# Patient Record
Sex: FEMALE | Race: WHITE | NOT HISPANIC OR LATINO | Employment: FULL TIME | URBAN - METROPOLITAN AREA
[De-identification: names, ages, dates, MRNs, and addresses within clinical notes are randomized per-mention and may not be internally consistent; named-entity substitution may affect disease eponyms.]

---

## 2022-03-06 RX ORDER — PROPRANOLOL HYDROCHLORIDE 20 MG/1
20 TABLET ORAL 2 TIMES DAILY
Qty: 60 TABLET | Refills: 6 | Status: SHIPPED | OUTPATIENT
Start: 2022-03-06 | End: 2022-08-16 | Stop reason: SDUPTHER

## 2022-08-04 ENCOUNTER — TELEPHONE (OUTPATIENT)
Dept: SCHEDULING | Facility: IMAGING CENTER | Age: 37
End: 2022-08-04

## 2022-08-16 ENCOUNTER — OFFICE VISIT (OUTPATIENT)
Dept: MEDICAL GROUP | Facility: MEDICAL CENTER | Age: 37
End: 2022-08-16
Payer: COMMERCIAL

## 2022-08-16 ENCOUNTER — HOSPITAL ENCOUNTER (OUTPATIENT)
Dept: RADIOLOGY | Facility: MEDICAL CENTER | Age: 37
End: 2022-08-16
Attending: FAMILY MEDICINE
Payer: COMMERCIAL

## 2022-08-16 VITALS
DIASTOLIC BLOOD PRESSURE: 78 MMHG | BODY MASS INDEX: 50.02 KG/M2 | OXYGEN SATURATION: 100 % | RESPIRATION RATE: 16 BRPM | WEIGHT: 293 LBS | SYSTOLIC BLOOD PRESSURE: 124 MMHG | HEIGHT: 64 IN | HEART RATE: 104 BPM | TEMPERATURE: 97.6 F

## 2022-08-16 DIAGNOSIS — M79.672 FOOT PAIN, BILATERAL: ICD-10-CM

## 2022-08-16 DIAGNOSIS — M79.671 FOOT PAIN, BILATERAL: ICD-10-CM

## 2022-08-16 DIAGNOSIS — E66.01 CLASS 3 SEVERE OBESITY DUE TO EXCESS CALORIES WITHOUT SERIOUS COMORBIDITY WITH BODY MASS INDEX (BMI) OF 50.0 TO 59.9 IN ADULT (HCC): ICD-10-CM

## 2022-08-16 DIAGNOSIS — E78.2 MIXED HYPERLIPIDEMIA: ICD-10-CM

## 2022-08-16 DIAGNOSIS — I45.6 WPW (WOLFF-PARKINSON-WHITE SYNDROME): ICD-10-CM

## 2022-08-16 PROBLEM — E28.2 PCOS (POLYCYSTIC OVARIAN SYNDROME): Status: ACTIVE | Noted: 2022-08-16

## 2022-08-16 PROBLEM — E66.813 CLASS 3 SEVERE OBESITY DUE TO EXCESS CALORIES WITHOUT SERIOUS COMORBIDITY WITH BODY MASS INDEX (BMI) OF 50.0 TO 59.9 IN ADULT (HCC): Status: ACTIVE | Noted: 2022-08-16

## 2022-08-16 PROCEDURE — 73630 X-RAY EXAM OF FOOT: CPT | Mod: LT

## 2022-08-16 PROCEDURE — 73630 X-RAY EXAM OF FOOT: CPT | Mod: RT

## 2022-08-16 PROCEDURE — 99203 OFFICE O/P NEW LOW 30 MIN: CPT | Performed by: FAMILY MEDICINE

## 2022-08-16 RX ORDER — BUPROPION HYDROCHLORIDE 300 MG/1
300 TABLET ORAL DAILY
COMMUNITY
End: 2022-08-16

## 2022-08-16 RX ORDER — PROPRANOLOL HYDROCHLORIDE 60 MG/1
60 TABLET ORAL 3 TIMES DAILY PRN
Qty: 90 TABLET | Refills: 2 | Status: SHIPPED | OUTPATIENT
Start: 2022-08-16

## 2022-08-16 ASSESSMENT — PATIENT HEALTH QUESTIONNAIRE - PHQ9: CLINICAL INTERPRETATION OF PHQ2 SCORE: 0

## 2022-08-16 NOTE — LETTER
August 16, 2022    To Whom It May Concern:         This is confirmation that Oralia CHRISTIANO Mallory attended her scheduled appointment with Jorge Hernandez D.O. on 8/16/22. Please allow her to wear athletic shoes for medical reasons.         If you have any questions please do not hesitate to call me at the phone number listed below.    Sincerely,          Jorge Hernandez D.O.  [phone]

## 2022-08-16 NOTE — ASSESSMENT & PLAN NOTE
Did not get to fully assess her discussed this but she does understand the importance of weight loss.  She reports good habits but difficulty losing weight and feels that it is hormonal in nature.  She does have known PCOS and will be following up with gynecology in the future.  I would like to continue to discuss this with her and work on it but we had some more acute matters at hand today.

## 2022-08-16 NOTE — ASSESSMENT & PLAN NOTE
Provided refill of propanolol that dose is closer to what she is taking.  Patient aware that anxiety certainly can be playing a role in this.  Labs to follow.  We will continue discussion and may need to refer to cardiology here in town.

## 2022-08-16 NOTE — ASSESSMENT & PLAN NOTE
Patient with bilateral foot pain she does understand the role her weight can play but she is also having a lot of pain in general first thing in the morning.  She gets some leg swelling as the day goes on this could be couple different things I suspect possible Planter fasciitis outside chance of stress fractures and then could be some venous insufficiency or poor lymphatic drainage low but possible chance of cardiac involvement though echo last year was reassuring.  We will get x-ray and referral to podiatry to start.

## 2022-08-16 NOTE — PROGRESS NOTES
Subjective:     CC:  Diagnoses of Foot pain, bilateral, Class 3 severe obesity due to excess calories without serious comorbidity with body mass index (BMI) of 50.0 to 59.9 in adult (HCC), WPW (Jorge-Parkinson-White syndrome), and Mixed hyperlipidemia were pertinent to this visit.    HISTORY OF THE PRESENT ILLNESS: Patient is a 36 y.o. female. This pleasant patient is here today to establish care and discuss palpitations and foot pain.     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  - works long hours and on feet on concrete all day  - right foot pain is in heel  - left foot is in ball of foot, feels like she is standing on a marble  - worse in the morning  -Is getting lower extremity edema that seems to worsen throughout the day    Problem   Pcos (Polycystic Ovarian Syndrome)   Foot Pain, Bilateral   Class 3 Severe Obesity Due to Excess Calories Without Serious Comorbidity With Body Mass Index (Bmi) of 50.0 to 59.9 in Adult (Hcc)    8/16/2022: 305 pounds, 138 kg, BMI 52.3     Wpw (Jorge-Parkinson-White Syndrome)    6/21/2021: Zio patch  Impression    This is a NORMAL study.   Duration of monitoring was 13 day(s).   Dominant rhythm is Sinus Rhythm.   Arrhythmias: None.   Symptoms: Symptoms were not associated with arrhythmia.     7/1/2021: Echo  CONCLUSIONS:   1. The left ventricle is small. There is no evidence of left ventricular   hypertrophy. Normal left ventricular systolic function. LV Ejection Fraction   is estimated to be  "55-60 %. Impaired LV relaxation, normal left atrial   pressures.   2. No left ventricular segmental wall motion abnormalities seen.   3. Normal right ventricular size and function.   4. No significant valvular abnormalities seen.   5. Normal pericardium with no significant pericardial effusion.   6. No previous study for comparison.        Mixed Hyperlipidemia   Labor and Delivery, Indication for Care (Resolved)       Current Outpatient Medications Ordered in Epic   Medication Sig Dispense Refill    propranolol (INDERAL) 60 MG tablet Take 1 Tablet by mouth 3 times a day as needed (tachycardia, anxiety). 90 Tablet 2     No current Epic-ordered facility-administered medications on file.       Health Maintenance: Patient wishes to be established with prior gynecologist for women's health will provide referral as needed    ROS:   ROS see HPI      Objective:       Exam: /78 (BP Location: Right arm, Patient Position: Sitting, BP Cuff Size: Large adult)   Pulse (!) 104   Temp 36.4 °C (97.6 °F)   Resp 16   Ht 1.626 m (5' 4\")   Wt (!) 138 kg (305 lb)   SpO2 100%  Body mass index is 52.35 kg/m².    Physical Exam  Vitals reviewed.   Constitutional:       General: She is not in acute distress.     Appearance: Normal appearance. She is obese.   HENT:      Head: Normocephalic and atraumatic.   Cardiovascular:      Pulses: Normal pulses.           Dorsalis pedis pulses are 2+ on the right side.   Pulmonary:      Effort: Pulmonary effort is normal.   Musculoskeletal:      Right lower leg: No edema.   Feet:      Right foot:      Skin integrity: Dry skin present.      Toenail Condition: Right toenails are normal.      Comments: No tenderness in the heel, no skin changes including no erythema or ecchymosis no noted swelling  Skin:     General: Skin is warm and dry.      Capillary Refill: Capillary refill takes less than 2 seconds.   Neurological:      Mental Status: She is alert. Mental status is at baseline.      Gait: " Gait abnormal.         Assessment & Plan:   36 y.o. female with the following -    Problem List Items Addressed This Visit       WPW (Jorge-Parkinson-White syndrome)     Provided refill of propanolol that dose is closer to what she is taking.  Patient aware that anxiety certainly can be playing a role in this.  Labs to follow.  We will continue discussion and may need to refer to cardiology here in town.         Relevant Medications    propranolol (INDERAL) 60 MG tablet    Other Relevant Orders    TSH WITH REFLEX TO FT4    Mixed hyperlipidemia     Previous labs showed mildly elevated lipid panel we will get a updated lab and discuss further         Relevant Medications    propranolol (INDERAL) 60 MG tablet    Other Relevant Orders    Comp Metabolic Panel    Lipid Profile    Foot pain, bilateral     Patient with bilateral foot pain she does understand the role her weight can play but she is also having a lot of pain in general first thing in the morning.  She gets some leg swelling as the day goes on this could be couple different things I suspect possible Planter fasciitis outside chance of stress fractures and then could be some venous insufficiency or poor lymphatic drainage low but possible chance of cardiac involvement though echo last year was reassuring.  We will get x-ray and referral to podiatry to start.         Relevant Orders    DX-FOOT-COMPLETE 3+ LEFT    DX-FOOT-COMPLETE 3+ RIGHT    Referral to Podiatry    Class 3 severe obesity due to excess calories without serious comorbidity with body mass index (BMI) of 50.0 to 59.9 in adult (HCC)     Did not get to fully assess her discussed this but she does understand the importance of weight loss.  She reports good habits but difficulty losing weight and feels that it is hormonal in nature.  She does have known PCOS and will be following up with gynecology in the future.  I would like to continue to discuss this with her and work on it but we had some more  acute matters at hand today.         Relevant Orders    CBC WITHOUT DIFFERENTIAL    Comp Metabolic Panel    Lipid Profile    TSH WITH REFLEX TO FT4       I spent a total of 32 minutes with record review, exam, communication with the patient, communication with other providers, and documentation of this encounter.    Return in about 4 weeks (around 9/13/2022), or if symptoms worsen or fail to improve, for Lab F/U, Lifestyle, Medication F/U.    Please note that this dictation was created using voice recognition software. I have made every reasonable attempt to correct obvious errors, but I expect that there are errors of grammar and possibly content that I did not discover before finalizing the note.

## 2022-09-13 ENCOUNTER — HOSPITAL ENCOUNTER (OUTPATIENT)
Dept: LAB | Facility: MEDICAL CENTER | Age: 37
End: 2022-09-13
Attending: FAMILY MEDICINE
Payer: COMMERCIAL

## 2022-09-13 DIAGNOSIS — E66.01 CLASS 3 SEVERE OBESITY DUE TO EXCESS CALORIES WITHOUT SERIOUS COMORBIDITY WITH BODY MASS INDEX (BMI) OF 50.0 TO 59.9 IN ADULT (HCC): ICD-10-CM

## 2022-09-13 DIAGNOSIS — I45.6 WPW (WOLFF-PARKINSON-WHITE SYNDROME): ICD-10-CM

## 2022-09-13 DIAGNOSIS — E78.2 MIXED HYPERLIPIDEMIA: ICD-10-CM

## 2022-09-13 LAB
ALBUMIN SERPL BCP-MCNC: 3.9 G/DL (ref 3.2–4.9)
ALBUMIN/GLOB SERPL: 1.2 G/DL
ALP SERPL-CCNC: 110 U/L (ref 30–99)
ALT SERPL-CCNC: 13 U/L (ref 2–50)
ANION GAP SERPL CALC-SCNC: 10 MMOL/L (ref 7–16)
AST SERPL-CCNC: 14 U/L (ref 12–45)
BILIRUB SERPL-MCNC: 0.4 MG/DL (ref 0.1–1.5)
BUN SERPL-MCNC: 15 MG/DL (ref 8–22)
CALCIUM SERPL-MCNC: 9.1 MG/DL (ref 8.5–10.5)
CHLORIDE SERPL-SCNC: 102 MMOL/L (ref 96–112)
CHOLEST SERPL-MCNC: 181 MG/DL (ref 100–199)
CO2 SERPL-SCNC: 24 MMOL/L (ref 20–33)
CREAT SERPL-MCNC: 0.87 MG/DL (ref 0.5–1.4)
ERYTHROCYTE [DISTWIDTH] IN BLOOD BY AUTOMATED COUNT: 42.7 FL (ref 35.9–50)
GFR SERPLBLD CREATININE-BSD FMLA CKD-EPI: 88 ML/MIN/1.73 M 2
GLOBULIN SER CALC-MCNC: 3.2 G/DL (ref 1.9–3.5)
GLUCOSE SERPL-MCNC: 120 MG/DL (ref 65–99)
HCT VFR BLD AUTO: 44 % (ref 37–47)
HDLC SERPL-MCNC: 42 MG/DL
HGB BLD-MCNC: 15.4 G/DL (ref 12–16)
LDLC SERPL CALC-MCNC: 109 MG/DL
MCH RBC QN AUTO: 31 PG (ref 27–33)
MCHC RBC AUTO-ENTMCNC: 35 G/DL (ref 33.6–35)
MCV RBC AUTO: 88.5 FL (ref 81.4–97.8)
PLATELET # BLD AUTO: 315 K/UL (ref 164–446)
PMV BLD AUTO: 10.8 FL (ref 9–12.9)
POTASSIUM SERPL-SCNC: 4.2 MMOL/L (ref 3.6–5.5)
PROT SERPL-MCNC: 7.1 G/DL (ref 6–8.2)
RBC # BLD AUTO: 4.97 M/UL (ref 4.2–5.4)
SODIUM SERPL-SCNC: 136 MMOL/L (ref 135–145)
TRIGL SERPL-MCNC: 148 MG/DL (ref 0–149)
TSH SERPL DL<=0.005 MIU/L-ACNC: 1.55 UIU/ML (ref 0.38–5.33)
WBC # BLD AUTO: 11.3 K/UL (ref 4.8–10.8)

## 2022-09-13 PROCEDURE — 84443 ASSAY THYROID STIM HORMONE: CPT

## 2022-09-13 PROCEDURE — 36415 COLL VENOUS BLD VENIPUNCTURE: CPT

## 2022-09-13 PROCEDURE — 80053 COMPREHEN METABOLIC PANEL: CPT

## 2022-09-13 PROCEDURE — 85027 COMPLETE CBC AUTOMATED: CPT

## 2022-09-13 PROCEDURE — 80061 LIPID PANEL: CPT

## 2023-03-24 ENCOUNTER — OFFICE VISIT (OUTPATIENT)
Dept: URGENT CARE | Facility: CLINIC | Age: 38
End: 2023-03-24
Payer: COMMERCIAL

## 2023-03-24 ENCOUNTER — APPOINTMENT (OUTPATIENT)
Dept: RADIOLOGY | Facility: IMAGING CENTER | Age: 38
End: 2023-03-24
Attending: PHYSICIAN ASSISTANT
Payer: COMMERCIAL

## 2023-03-24 VITALS
OXYGEN SATURATION: 95 % | SYSTOLIC BLOOD PRESSURE: 132 MMHG | HEIGHT: 64 IN | WEIGHT: 293 LBS | RESPIRATION RATE: 16 BRPM | HEART RATE: 95 BPM | DIASTOLIC BLOOD PRESSURE: 80 MMHG | BODY MASS INDEX: 50.02 KG/M2 | TEMPERATURE: 96.1 F

## 2023-03-24 DIAGNOSIS — R05.1 ACUTE COUGH: ICD-10-CM

## 2023-03-24 DIAGNOSIS — J06.9 VIRAL URI: ICD-10-CM

## 2023-03-24 LAB
FLUAV RNA SPEC QL NAA+PROBE: NEGATIVE
FLUBV RNA SPEC QL NAA+PROBE: NEGATIVE
RSV RNA SPEC QL NAA+PROBE: NEGATIVE
SARS-COV-2 RNA RESP QL NAA+PROBE: NEGATIVE

## 2023-03-24 PROCEDURE — 0241U POCT CEPHEID COV-2, FLU A/B, RSV - PCR: CPT | Performed by: PHYSICIAN ASSISTANT

## 2023-03-24 PROCEDURE — 71046 X-RAY EXAM CHEST 2 VIEWS: CPT | Mod: TC | Performed by: PHYSICIAN ASSISTANT

## 2023-03-24 PROCEDURE — 99213 OFFICE O/P EST LOW 20 MIN: CPT | Performed by: PHYSICIAN ASSISTANT

## 2023-03-24 ASSESSMENT — ENCOUNTER SYMPTOMS
VOMITING: 0
EYE REDNESS: 0
NAUSEA: 1
SORE THROAT: 1
COUGH: 1
HEADACHES: 0
FEVER: 0
SHORTNESS OF BREATH: 1
MYALGIAS: 1
EYE DISCHARGE: 0
WHEEZING: 0
DIARRHEA: 1

## 2023-03-24 ASSESSMENT — FIBROSIS 4 INDEX: FIB4 SCORE: 0.47

## 2023-03-24 NOTE — PROGRESS NOTES
Subjective     Hallie Mallory is a 37 y.o. female who presents with Pharyngitis (X 2 days, sore throat, nasal congestion, runny nose, lungs feel 'wet', SOB)        URI   This is a new problem. Episode onset: x 2 days ago. The problem has been unchanged. There has been no fever. Associated symptoms include congestion, coughing, diarrhea, nausea and a sore throat. Pertinent negatives include no ear pain, headaches, vomiting or wheezing. She has tried acetaminophen, NSAIDs and decongestant (and OTC nasal spray) for the symptoms.     The patient states her daughter has had a runny nose. The patient reports no known exposure to COVID-19 and/or influenza.    The patient reports a history of pneumonia.    PMH:  has a past medical history of Neurologic cardiac syncope, Pregnancy, and Stroke (HCC).  MEDS:   Current Outpatient Medications:     propranolol (INDERAL) 60 MG tablet, Take 1 Tablet by mouth 3 times a day as needed (tachycardia, anxiety)., Disp: 90 Tablet, Rfl: 2  ALLERGIES:   Allergies   Allergen Reactions    Nkda [No Known Drug Allergy]      SURGHX:   Past Surgical History:   Procedure Laterality Date    PRIMARY C SECTION  09/15/2013    Performed by Jac Salcedo M.D. at [facility], has had second C/S in 2017    OTHER CARDIAC SURGERY       SOCHX:  reports that she quit smoking about 12 years ago. Her smoking use included cigarettes. She has never used smokeless tobacco. She reports that she does not drink alcohol and does not use drugs.  FH: Family history was reviewed, no pertinent findings to report      Review of Systems   Constitutional:  Negative for fever.   HENT:  Positive for congestion and sore throat. Negative for ear pain.    Eyes:  Negative for discharge and redness.   Respiratory:  Positive for cough and shortness of breath (The patient reports intermittent shortness of breath.  The patient states taking a deep breath often causes her to cough.). Negative for wheezing.   "  Gastrointestinal:  Positive for diarrhea and nausea. Negative for vomiting.   Musculoskeletal:  Positive for myalgias.   Neurological:  Negative for headaches.        Objective   /80   Pulse 95   Temp (!) 35.6 °C (96.1 °F) (Temporal)   Resp 16   Ht 1.626 m (5' 4\")   Wt (!) 142 kg (312 lb)   SpO2 95%   BMI 53.55 kg/m²      Physical Exam  Constitutional:       General: She is not in acute distress.     Appearance: Normal appearance. She is not ill-appearing.   HENT:      Head: Normocephalic and atraumatic.      Right Ear: External ear normal.      Left Ear: External ear normal.      Nose: Nose normal.      Mouth/Throat:      Mouth: Mucous membranes are moist.      Pharynx: Oropharynx is clear. Posterior oropharyngeal erythema present.   Eyes:      Extraocular Movements: Extraocular movements intact.      Conjunctiva/sclera: Conjunctivae normal.   Cardiovascular:      Rate and Rhythm: Normal rate and regular rhythm.      Heart sounds: Normal heart sounds.   Pulmonary:      Effort: Pulmonary effort is normal. No respiratory distress.      Breath sounds: Rales (sligth rales to the bilateral bases) present. No wheezing.   Musculoskeletal:         General: Normal range of motion.      Cervical back: Normal range of motion and neck supple.   Skin:     General: Skin is warm and dry.   Neurological:      Mental Status: She is alert and oriented to person, place, and time.             Progress:  Results for orders placed or performed in visit on 03/24/23   POCT CoV-2, Flu A/B, RSV by PCR   Result Value Ref Range    SARS-CoV-2 by PCR Negative Negative, Invalid    Influenza virus A RNA Negative Negative, Invalid    Influenza virus B, PCR Negative Negative, Invalid    RSV, PCR Negative Negative, Invalid         CXR:  COMPARISON:  12/21/2000     FINDINGS:  The heart is normal in size.  No pulmonary infiltrates or consolidations are noted.  No pleural effusions are appreciated.        IMPRESSION:  No active disease. "     Reviewed chest x-ray results with the patient in clinic           Assessment & Plan          1. Viral URI    2. Acute cough  - DX-CHEST-2 VIEWS; Future  - POCT CoV-2, Flu A/B, RSV by PCR    The patient's presenting symptoms and physical exam findings are consistent with a viral URI with associated cough.  On physical exam, the patient had slight erythema to the posterior pharynx without tonsillar hypertrophy or exudates.  The patient's uvula was midline.  The patient also had slight rales to the bilateral bases without wheezing or rhonchi.  The patient's pulse ox was within normal limits.  The patient is nontoxic and appears in no acute distress.  The patient's vital signs are stable and within normal limits.  She is afebrile today in clinic.  The patient reports a personal history of pneumonia with similar symptoms.  A chest x-ray was obtained to further evaluate the patient's current symptoms.  The patient's chest x-ray showed no active disease.  Discussed likely viral etiology with the patient.  The patient's POCT Cepheid viral testing today in clinic was negative for COVID-19, influenza, and RSV.  Advised the patient to monitor for worsening signs or symptoms.  Recommend OTC medications and supportive care for symptomatic management.  Recommend the patient follow-up with her PCP as needed.  Discussed return precautions with the patient, and she verbalized understanding.       Differential diagnoses, supportive care, and indications for immediate follow-up discussed with patient.   Instructed to return to clinic or nearest emergency department for any change in condition, further concerns, or worsening of symptoms.    OTC Tylenol or Motrin for fever/discomfort.  OTC cough/cold medication for symptomatic relief- such as Mucinex   OTC Supportive Care for Congestion - saline nasal spray or neti pot  Drink plenty of fluids  Follow-up with PCP  Return to clinic or go to the ED if symptoms worsen or fail to  improve, or if the patient should develop worsening/increasing cough, congestion, ear pain, sore throat, shortness of breath, wheezing, chest pain, fever/chills, and/or any concerning symptoms.    Discussed plan with the patient, and she agrees to the above.     I personally reviewed prior external notes and test results pertinent to today's visit.  I have independently reviewed and interpreted all diagnostics ordered during this urgent care visit.     Please note that this dictation was created using voice recognition software. I have made every reasonable attempt to correct obvious errors, but I expect that there may be errors of grammar and possibly content that I did not discover before finalizing the note.     This note was electronically signed by Kathya Kc PA-C

## 2023-09-13 ENCOUNTER — OFFICE VISIT (OUTPATIENT)
Dept: URGENT CARE | Facility: CLINIC | Age: 38
End: 2023-09-13
Payer: COMMERCIAL

## 2023-09-13 VITALS
SYSTOLIC BLOOD PRESSURE: 122 MMHG | HEIGHT: 64 IN | TEMPERATURE: 98.4 F | HEART RATE: 110 BPM | WEIGHT: 293 LBS | RESPIRATION RATE: 20 BRPM | DIASTOLIC BLOOD PRESSURE: 82 MMHG | BODY MASS INDEX: 50.02 KG/M2 | OXYGEN SATURATION: 96 %

## 2023-09-13 DIAGNOSIS — J06.9 VIRAL URI: ICD-10-CM

## 2023-09-13 LAB
FLUAV RNA SPEC QL NAA+PROBE: NEGATIVE
FLUBV RNA SPEC QL NAA+PROBE: NEGATIVE
RSV RNA SPEC QL NAA+PROBE: NEGATIVE
S PYO DNA SPEC NAA+PROBE: NOT DETECTED
SARS-COV-2 RNA RESP QL NAA+PROBE: NEGATIVE

## 2023-09-13 PROCEDURE — 0241U POCT CEPHEID COV-2, FLU A/B, RSV - PCR: CPT | Performed by: NURSE PRACTITIONER

## 2023-09-13 PROCEDURE — 3079F DIAST BP 80-89 MM HG: CPT | Performed by: NURSE PRACTITIONER

## 2023-09-13 PROCEDURE — 87651 STREP A DNA AMP PROBE: CPT | Performed by: NURSE PRACTITIONER

## 2023-09-13 PROCEDURE — 3074F SYST BP LT 130 MM HG: CPT | Performed by: NURSE PRACTITIONER

## 2023-09-13 PROCEDURE — 99213 OFFICE O/P EST LOW 20 MIN: CPT | Performed by: NURSE PRACTITIONER

## 2023-09-13 RX ORDER — ALBUTEROL SULFATE 90 UG/1
2 AEROSOL, METERED RESPIRATORY (INHALATION) EVERY 6 HOURS PRN
Qty: 8.5 G | Refills: 0 | Status: SHIPPED | OUTPATIENT
Start: 2023-09-13

## 2023-09-13 ASSESSMENT — FIBROSIS 4 INDEX: FIB4 SCORE: 0.47

## 2023-09-13 NOTE — PROGRESS NOTES
Date: 09/13/23     Chief Complaint:    Chief Complaint   Patient presents with    Sinusitis     X3days Head/face pressure/nasal congestion/sweats        History of Present Illness: 37 y.o. female  presents to clinic with 3-day history of sinus congestion pain pressure body aches with chills dry sore throat bronchial tightness with mild cough.  She also reports increased fatigue.  She has been using DayQuil and NyQuil for symptoms which are mildly helpful.  She denies any nausea vomiting diarrhea.  No severe shortness of breath chest pain or lower leg swelling.  She presents to clinic for evaluation.      ROS:  As stated in HPI       Medical/SX/ Social History:  Reviewed per chart    Medications:    Current Outpatient Medications on File Prior to Visit   Medication Sig Dispense Refill    propranolol (INDERAL) 60 MG tablet Take 1 Tablet by mouth 3 times a day as needed (tachycardia, anxiety). 90 Tablet 2     No current facility-administered medications on file prior to visit.        Allergies:    Nkda [no known drug allergy]     Problem list, medications, and allergies reviewed by myself today in Epic       Physical Exam:  Vitals:    09/13/23 1530   BP: 122/82   Pulse: (!) 110   Resp: 20   Temp: 36.9 °C (98.4 °F)   SpO2: 96%        Physical Exam  Vitals reviewed.   Constitutional:       General: She is not in acute distress.     Appearance: Normal appearance. She is well-developed. She is not toxic-appearing.   HENT:      Head: Normocephalic and atraumatic.      Right Ear: Tympanic membrane, ear canal and external ear normal.      Left Ear: Tympanic membrane, ear canal and external ear normal.      Nose: Congestion and rhinorrhea present.      Mouth/Throat:      Lips: Pink.      Mouth: Mucous membranes are moist.      Pharynx: Posterior oropharyngeal erythema present.   Eyes:      General: Lids are normal. Gaze aligned appropriately. No allergic shiner or scleral icterus.     Extraocular Movements: Extraocular  movements intact.      Conjunctiva/sclera: Conjunctivae normal.      Pupils: Pupils are equal, round, and reactive to light.   Cardiovascular:      Rate and Rhythm: Normal rate and regular rhythm.      Pulses:           Radial pulses are 2+ on the right side and 2+ on the left side.      Heart sounds: Normal heart sounds.   Pulmonary:      Effort: Pulmonary effort is normal.      Breath sounds: Normal breath sounds. No decreased breath sounds, wheezing, rhonchi or rales.   Musculoskeletal:      Right lower leg: No edema.      Left lower leg: No edema.   Lymphadenopathy:      Cervical: No cervical adenopathy.   Skin:     General: Skin is warm.      Capillary Refill: Capillary refill takes less than 2 seconds.      Coloration: Skin is not cyanotic or pale.      Findings: No rash.   Neurological:      Mental Status: She is alert.      Gait: Gait is intact.   Psychiatric:         Behavior: Behavior normal. Behavior is cooperative.          Diagnostics:    COVID-negative  Influenza negative  Strep negative      Diagnostics interpreted by myself.      Medical Decision Making / Plan :  I personally reviewed prior external notes and test results pertinent to today's visit. Pt is clinically stable at today's acute urgent care visit.  Patient appears nontoxic with no acute distress noted. Appropriate for outpatient care at this time. The patient remained stable during the urgent care visit.     Pleasant 37 y.o. female  presented clinic with HPI exam findings most consistent with a viral URI.  Fortunately lung sounds are clear vital signs are stable.  Due to subjective bronchial tightness did send in for albuterol inhaler.  Did discuss appropriate use.  Did obtain testing for COVID flu RSV as well as strep pharyngitis fortunately negative.  Discussed viral etiology as a cause of symptoms noted bacterial rocio on exam no indicating need antibiotics at this time.  Shared decision-making was utilized with patient for treatment  plan. Differential Diagnosis, natural history, and supportive care discussed. Did advise patient on conservative measures for management of symptoms.  Patient is agreeable to pursue adequate rest, adequate hydration, saltwater gargle and Neti pot for any symptoms of upper respiratory congestion.  Over-the-counter analgesia and antipyretics on a p.r.n. basis as needed for pain and fever.  Did discuss over-the-counter cough medications.      1. Viral URI    - POCT CEPHEID GROUP A STREP - PCR  - POCT CoV-2, Flu A/B, RSV by PCR  - albuterol 108 (90 Base) MCG/ACT Aero Soln inhalation aerosol; Inhale 2 Puffs every 6 hours as needed for Shortness of Breath.  Dispense: 8.5 g; Refill: 0     Medication discussed included indication for use and the potential benefits and side effects. Education was provided regarding the aforementioned assessments.  All of the patient's questions were answered to their satisfaction at the time of discharge. Patient was encouraged to monitor symptoms closely. Those signs and symptoms which would warrant concern and mandate seeking a higher level of service through the emergency department discussed at length.  Patient stated agreement and understanding of this plan of care.        Disposition:  Patient was discharged in stable condition.    Voice Recognition Disclaimer: Portions of this document were created using voice recognition software. The software does have a chance of producing errors of grammar and possibly content. I have made every reasonable attempt to correct obvious errors, but there may be errors of grammar and possibly content that I did not discover before finalizing the documentation.    Katiana Owen, A.P.R.N.

## 2023-09-13 NOTE — LETTER
September 13, 2023    To Whom It May Concern:         This is confirmation that Oralia Silverman [place] attended her scheduled appointment with JOBY Ponce on 9/13/23.Please excuse from work due to illness. 9/11/23 through 9/14/23. May return 9/15/23,pending covid testing         If you have any questions please do not hesitate to call me at the phone number listed below.    Sincerely,          AMY PonceRTeaganN.  [phone]

## 2024-03-26 ENCOUNTER — OFFICE VISIT (OUTPATIENT)
Dept: BEHAVIORAL HEALTH | Facility: CLINIC | Age: 39
End: 2024-03-26
Payer: COMMERCIAL

## 2024-03-26 DIAGNOSIS — F33.1 MAJOR DEPRESSIVE DISORDER, RECURRENT EPISODE, MODERATE (HCC): ICD-10-CM

## 2024-03-26 DIAGNOSIS — F41.1 GAD (GENERALIZED ANXIETY DISORDER): ICD-10-CM

## 2024-03-26 DIAGNOSIS — F90.2 ATTENTION DEFICIT HYPERACTIVITY DISORDER (ADHD), COMBINED TYPE: ICD-10-CM

## 2024-03-26 DIAGNOSIS — F43.10 PTSD (POST-TRAUMATIC STRESS DISORDER): ICD-10-CM

## 2024-03-26 PROCEDURE — 99204 OFFICE O/P NEW MOD 45 MIN: CPT | Mod: GC | Performed by: PSYCHIATRY & NEUROLOGY

## 2024-03-26 RX ORDER — METHYLPHENIDATE HYDROCHLORIDE 18 MG/1
18 TABLET ORAL EVERY MORNING
Qty: 30 TABLET | Refills: 0 | Status: SHIPPED | OUTPATIENT
Start: 2024-03-26 | End: 2024-04-25
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
## 2024-03-26 NOTE — ADDENDUM NOTE
Addended by: ALCIRA RICHARDSON on: 3/26/2024 03:53 PM     Modules accepted: Level of Service     Problem: Falls - Risk of  Goal: *Absence of Falls  Description  Document Zuleika Quigley Fall Risk and appropriate interventions in the flowsheet.   Outcome: Progressing Towards Goal  Note: Fall Risk Interventions:  Mobility Interventions: Communicate number of staff needed for ambulation/transfer, Patient to call before getting OOB         Medication Interventions: Evaluate medications/consider consulting pharmacy, Patient to call before getting OOB, Teach patient to arise slowly    Elimination Interventions: Call light in reach, Patient to call for help with toileting needs    History of Falls Interventions: Door open when patient unattended, Evaluate medications/consider consulting pharmacy, Investigate reason for fall, Room close to nurse's station         Problem: Patient Education: Go to Patient Education Activity  Goal: Patient/Family Education  Outcome: Progressing Towards Goal

## 2024-04-09 ENCOUNTER — DOCUMENTATION (OUTPATIENT)
Dept: BEHAVIORAL HEALTH | Facility: CLINIC | Age: 39
End: 2024-04-09
Payer: COMMERCIAL

## 2024-09-18 ENCOUNTER — HOSPITAL ENCOUNTER (EMERGENCY)
Facility: MEDICAL CENTER | Age: 39
End: 2024-09-18
Attending: STUDENT IN AN ORGANIZED HEALTH CARE EDUCATION/TRAINING PROGRAM
Payer: COMMERCIAL

## 2024-09-18 ENCOUNTER — APPOINTMENT (OUTPATIENT)
Dept: RADIOLOGY | Facility: MEDICAL CENTER | Age: 39
End: 2024-09-18
Attending: STUDENT IN AN ORGANIZED HEALTH CARE EDUCATION/TRAINING PROGRAM
Payer: COMMERCIAL

## 2024-09-18 ENCOUNTER — OFFICE VISIT (OUTPATIENT)
Dept: URGENT CARE | Facility: CLINIC | Age: 39
End: 2024-09-18
Payer: COMMERCIAL

## 2024-09-18 VITALS
SYSTOLIC BLOOD PRESSURE: 148 MMHG | WEIGHT: 293 LBS | RESPIRATION RATE: 18 BRPM | BODY MASS INDEX: 50.02 KG/M2 | HEART RATE: 103 BPM | OXYGEN SATURATION: 97 % | DIASTOLIC BLOOD PRESSURE: 110 MMHG | TEMPERATURE: 97.8 F | HEIGHT: 64 IN

## 2024-09-18 VITALS
TEMPERATURE: 97.9 F | SYSTOLIC BLOOD PRESSURE: 144 MMHG | WEIGHT: 292.99 LBS | HEIGHT: 64 IN | RESPIRATION RATE: 18 BRPM | HEART RATE: 80 BPM | BODY MASS INDEX: 50.02 KG/M2 | DIASTOLIC BLOOD PRESSURE: 79 MMHG | OXYGEN SATURATION: 94 %

## 2024-09-18 DIAGNOSIS — R50.9 FEVER, UNSPECIFIED FEVER CAUSE: ICD-10-CM

## 2024-09-18 DIAGNOSIS — R10.12 LUQ ABDOMINAL PAIN: ICD-10-CM

## 2024-09-18 DIAGNOSIS — R10.0 ACUTE ABDOMEN: ICD-10-CM

## 2024-09-18 LAB
ALBUMIN SERPL BCP-MCNC: 3.9 G/DL (ref 3.2–4.9)
ALBUMIN/GLOB SERPL: 1.3 G/DL
ALP SERPL-CCNC: 86 U/L (ref 30–99)
ALT SERPL-CCNC: 17 U/L (ref 2–50)
ANION GAP SERPL CALC-SCNC: 12 MMOL/L (ref 7–16)
APPEARANCE UR: CLEAR
AST SERPL-CCNC: 16 U/L (ref 12–45)
BASOPHILS # BLD AUTO: 0.4 % (ref 0–1.8)
BASOPHILS # BLD: 0.05 K/UL (ref 0–0.12)
BILIRUB SERPL-MCNC: 0.2 MG/DL (ref 0.1–1.5)
BILIRUB UR QL STRIP.AUTO: NEGATIVE
BUN SERPL-MCNC: 16 MG/DL (ref 8–22)
CALCIUM ALBUM COR SERPL-MCNC: 9 MG/DL (ref 8.5–10.5)
CALCIUM SERPL-MCNC: 8.9 MG/DL (ref 8.5–10.5)
CHLORIDE SERPL-SCNC: 105 MMOL/L (ref 96–112)
CO2 SERPL-SCNC: 22 MMOL/L (ref 20–33)
COLOR UR: YELLOW
CREAT SERPL-MCNC: 0.8 MG/DL (ref 0.5–1.4)
EOSINOPHIL # BLD AUTO: 0.27 K/UL (ref 0–0.51)
EOSINOPHIL NFR BLD: 2 % (ref 0–6.9)
ERYTHROCYTE [DISTWIDTH] IN BLOOD BY AUTOMATED COUNT: 39.7 FL (ref 35.9–50)
FLUAV RNA SPEC QL NAA+PROBE: NEGATIVE
FLUBV RNA SPEC QL NAA+PROBE: NEGATIVE
GFR SERPLBLD CREATININE-BSD FMLA CKD-EPI: 96 ML/MIN/1.73 M 2
GLOBULIN SER CALC-MCNC: 3.1 G/DL (ref 1.9–3.5)
GLUCOSE SERPL-MCNC: 126 MG/DL (ref 65–99)
GLUCOSE UR STRIP.AUTO-MCNC: NEGATIVE MG/DL
HCG SERPL QL: NEGATIVE
HCT VFR BLD AUTO: 41.2 % (ref 37–47)
HGB BLD-MCNC: 14.9 G/DL (ref 12–16)
IMM GRANULOCYTES # BLD AUTO: 0.04 K/UL (ref 0–0.11)
IMM GRANULOCYTES NFR BLD AUTO: 0.3 % (ref 0–0.9)
KETONES UR STRIP.AUTO-MCNC: NEGATIVE MG/DL
LEUKOCYTE ESTERASE UR QL STRIP.AUTO: NEGATIVE
LIPASE SERPL-CCNC: 31 U/L (ref 11–82)
LYMPHOCYTES # BLD AUTO: 3.52 K/UL (ref 1–4.8)
LYMPHOCYTES NFR BLD: 26.5 % (ref 22–41)
MCH RBC QN AUTO: 31.8 PG (ref 27–33)
MCHC RBC AUTO-ENTMCNC: 36.2 G/DL (ref 32.2–35.5)
MCV RBC AUTO: 87.8 FL (ref 81.4–97.8)
MICRO URNS: NORMAL
MONOCYTES # BLD AUTO: 0.64 K/UL (ref 0–0.85)
MONOCYTES NFR BLD AUTO: 4.8 % (ref 0–13.4)
NEUTROPHILS # BLD AUTO: 8.77 K/UL (ref 1.82–7.42)
NEUTROPHILS NFR BLD: 66 % (ref 44–72)
NITRITE UR QL STRIP.AUTO: NEGATIVE
NRBC # BLD AUTO: 0 K/UL
NRBC BLD-RTO: 0 /100 WBC (ref 0–0.2)
PH UR STRIP.AUTO: 5 [PH] (ref 5–8)
PLATELET # BLD AUTO: 291 K/UL (ref 164–446)
PMV BLD AUTO: 9.9 FL (ref 9–12.9)
POTASSIUM SERPL-SCNC: 3.7 MMOL/L (ref 3.6–5.5)
PROT SERPL-MCNC: 7 G/DL (ref 6–8.2)
PROT UR QL STRIP: NEGATIVE MG/DL
RBC # BLD AUTO: 4.69 M/UL (ref 4.2–5.4)
RBC UR QL AUTO: NEGATIVE
RSV RNA SPEC QL NAA+PROBE: NEGATIVE
SARS-COV-2 RNA RESP QL NAA+PROBE: NOTDETECTED
SODIUM SERPL-SCNC: 139 MMOL/L (ref 135–145)
SP GR UR STRIP.AUTO: 1.02
UROBILINOGEN UR STRIP.AUTO-MCNC: 0.2 MG/DL
WBC # BLD AUTO: 13.3 K/UL (ref 4.8–10.8)

## 2024-09-18 PROCEDURE — 74177 CT ABD & PELVIS W/CONTRAST: CPT

## 2024-09-18 PROCEDURE — 83690 ASSAY OF LIPASE: CPT

## 2024-09-18 PROCEDURE — 0241U HCHG SARS-COV-2 COVID-19 NFCT DS RESP RNA 4 TRGT ED POC: CPT

## 2024-09-18 PROCEDURE — 3080F DIAST BP >= 90 MM HG: CPT | Performed by: PHYSICIAN ASSISTANT

## 2024-09-18 PROCEDURE — 99284 EMERGENCY DEPT VISIT MOD MDM: CPT

## 2024-09-18 PROCEDURE — 85025 COMPLETE CBC W/AUTO DIFF WBC: CPT

## 2024-09-18 PROCEDURE — 80053 COMPREHEN METABOLIC PANEL: CPT

## 2024-09-18 PROCEDURE — 81003 URINALYSIS AUTO W/O SCOPE: CPT

## 2024-09-18 PROCEDURE — 700117 HCHG RX CONTRAST REV CODE 255: Performed by: STUDENT IN AN ORGANIZED HEALTH CARE EDUCATION/TRAINING PROGRAM

## 2024-09-18 PROCEDURE — A9270 NON-COVERED ITEM OR SERVICE: HCPCS | Performed by: STUDENT IN AN ORGANIZED HEALTH CARE EDUCATION/TRAINING PROGRAM

## 2024-09-18 PROCEDURE — 3077F SYST BP >= 140 MM HG: CPT | Performed by: PHYSICIAN ASSISTANT

## 2024-09-18 PROCEDURE — 700105 HCHG RX REV CODE 258: Performed by: STUDENT IN AN ORGANIZED HEALTH CARE EDUCATION/TRAINING PROGRAM

## 2024-09-18 PROCEDURE — 93005 ELECTROCARDIOGRAM TRACING: CPT | Performed by: STUDENT IN AN ORGANIZED HEALTH CARE EDUCATION/TRAINING PROGRAM

## 2024-09-18 PROCEDURE — 99215 OFFICE O/P EST HI 40 MIN: CPT | Performed by: PHYSICIAN ASSISTANT

## 2024-09-18 PROCEDURE — 36415 COLL VENOUS BLD VENIPUNCTURE: CPT

## 2024-09-18 PROCEDURE — 700111 HCHG RX REV CODE 636 W/ 250 OVERRIDE (IP): Performed by: STUDENT IN AN ORGANIZED HEALTH CARE EDUCATION/TRAINING PROGRAM

## 2024-09-18 PROCEDURE — 700102 HCHG RX REV CODE 250 W/ 637 OVERRIDE(OP): Performed by: STUDENT IN AN ORGANIZED HEALTH CARE EDUCATION/TRAINING PROGRAM

## 2024-09-18 PROCEDURE — 84703 CHORIONIC GONADOTROPIN ASSAY: CPT

## 2024-09-18 RX ORDER — SUCRALFATE 1 G/1
1 TABLET ORAL
Qty: 40 TABLET | Refills: 3 | Status: SHIPPED | OUTPATIENT
Start: 2024-09-18

## 2024-09-18 RX ORDER — SUCRALFATE 1 G/1
1 TABLET ORAL ONCE
Status: COMPLETED | OUTPATIENT
Start: 2024-09-18 | End: 2024-09-18

## 2024-09-18 RX ORDER — FAMOTIDINE 20 MG/1
20 TABLET, FILM COATED ORAL 2 TIMES DAILY
Qty: 60 TABLET | Refills: 0 | Status: SHIPPED | OUTPATIENT
Start: 2024-09-18

## 2024-09-18 RX ORDER — DICYCLOMINE HCL 20 MG
20 TABLET ORAL ONCE
Status: COMPLETED | OUTPATIENT
Start: 2024-09-18 | End: 2024-09-18

## 2024-09-18 RX ORDER — FAMOTIDINE 20 MG/1
20 TABLET, FILM COATED ORAL ONCE
Status: COMPLETED | OUTPATIENT
Start: 2024-09-18 | End: 2024-09-18

## 2024-09-18 RX ORDER — ONDANSETRON 4 MG/1
4 TABLET, ORALLY DISINTEGRATING ORAL ONCE
Status: COMPLETED | OUTPATIENT
Start: 2024-09-18 | End: 2024-09-18

## 2024-09-18 RX ORDER — SODIUM CHLORIDE, SODIUM LACTATE, POTASSIUM CHLORIDE, CALCIUM CHLORIDE 600; 310; 30; 20 MG/100ML; MG/100ML; MG/100ML; MG/100ML
1000 INJECTION, SOLUTION INTRAVENOUS ONCE
Status: COMPLETED | OUTPATIENT
Start: 2024-09-18 | End: 2024-09-18

## 2024-09-18 RX ADMIN — ONDANSETRON 4 MG: 4 TABLET, ORALLY DISINTEGRATING ORAL at 20:44

## 2024-09-18 RX ADMIN — LIDOCAINE HYDROCHLORIDE 30 ML: 20 SOLUTION ORAL; TOPICAL at 20:42

## 2024-09-18 RX ADMIN — SODIUM CHLORIDE, POTASSIUM CHLORIDE, SODIUM LACTATE AND CALCIUM CHLORIDE 1000 ML: 600; 310; 30; 20 INJECTION, SOLUTION INTRAVENOUS at 20:21

## 2024-09-18 RX ADMIN — FAMOTIDINE 20 MG: 20 TABLET, FILM COATED ORAL at 20:45

## 2024-09-18 RX ADMIN — SUCRALFATE 1 G: 1 TABLET ORAL at 20:46

## 2024-09-18 RX ADMIN — DICYCLOMINE HYDROCHLORIDE 20 MG: 20 TABLET ORAL at 20:43

## 2024-09-18 RX ADMIN — IOHEXOL 100 ML: 350 INJECTION, SOLUTION INTRAVENOUS at 22:08

## 2024-09-18 ASSESSMENT — ENCOUNTER SYMPTOMS
SHORTNESS OF BREATH: 0
NAUSEA: 1
VOMITING: 0
COUGH: 0
FLATUS: 0
FEVER: 1
CONSTIPATION: 0
ABDOMINAL PAIN: 1
HEMATOCHEZIA: 0
HEADACHES: 0
FLANK PAIN: 0
MYALGIAS: 0
ANOREXIA: 0
DIARRHEA: 0
BLOOD IN STOOL: 0
CHILLS: 1

## 2024-09-18 ASSESSMENT — PAIN DESCRIPTION - PAIN TYPE: TYPE: ACUTE PAIN

## 2024-09-19 LAB — EKG IMPRESSION: NORMAL

## 2024-09-19 NOTE — PROGRESS NOTES
Subjective     Hallie Mallory is a 38 y.o. female who presents with GI Problem (Stomach ache, fever, loose stools x 2 days )            Abdominal Pain  This is a new problem. The current episode started yesterday. The onset quality is sudden. The problem occurs constantly. The problem has been gradually worsening. The pain is located in the generalized abdominal region. The pain is at a severity of 6/10. Associated symptoms include a fever (TMAX 102F 2 hours ago- took 4 ibuprofen) and nausea. Pertinent negatives include no anorexia, constipation, diarrhea, dysuria, flatus, frequency, headaches, hematochezia, hematuria, melena, myalgias or vomiting. The pain is aggravated by eating. The pain is relieved by Nothing. She has tried acetaminophen (ibuprofen) for the symptoms. The treatment provided no relief. There is no history of abdominal surgery.       Past Medical History:   Diagnosis Date    Neurologic cardiac syncope     Pregnancy     Stroke (HCC)        Past Surgical History:   Procedure Laterality Date    PRIMARY C SECTION  09/15/2013    Performed by Jac [facility]ced[facility] M[facility]. [facility], has had second C/S in 2017    OTHER CARDIAC SURGERY         Family History   Problem Relation Age of Onset    Cancer Mother     Peripheral vascular disease Mother        Allergies:  Eggs and Nkda [no known drug allergy]      Medications, Allergies, and current problem list reviewed today in Epic      Review of Systems   Constitutional:  Positive for chills, fever (TMAX 102F 2 hours ago- took 4 ibuprofen) and malaise/fatigue.   Respiratory:  Negative for cough and shortness of breath.    Cardiovascular:  Negative for chest pain.   Gastrointestinal:  Positive for abdominal pain and nausea. Negative for anorexia, blood in stool, constipation, diarrhea, flatus, hematochezia, melena and vomiting.   Genitourinary:  Negative for dysuria, flank pain, frequency, hematuria and urgency.   Musculoskeletal:  Negative for  "myalgias.   Neurological:  Negative for headaches.        All other systems reviewed and are negative.         Objective     BP (!) 148/110 (BP Location: Left arm, Patient Position: Sitting, BP Cuff Size: Large adult long)   Pulse (!) 103   Temp 36.6 °C (97.8 °F) (Temporal)   Resp 18   Ht 1.626 m (5' 4\")   Wt (!) 133 kg (293 lb 3.2 oz)   SpO2 97%   BMI 50.33 kg/m²      Physical Exam  Constitutional:       General: She is not in acute distress.     Appearance: She is not ill-appearing.   HENT:      Head: Normocephalic and atraumatic.   Eyes:      General: No scleral icterus.     Conjunctiva/sclera: Conjunctivae normal.   Cardiovascular:      Rate and Rhythm: Normal rate and regular rhythm.      Heart sounds: Normal heart sounds.   Pulmonary:      Effort: Pulmonary effort is normal. No respiratory distress.      Breath sounds: No stridor. No wheezing.   Abdominal:      General: There is distension.      Palpations: Abdomen is soft. There is no mass.      Tenderness: There is abdominal tenderness in the right upper quadrant, right lower quadrant, epigastric area and periumbilical area. There is guarding. There is no rebound. Positive signs include Welsh's sign and McBurney's sign.   Skin:     General: Skin is warm and dry.   Neurological:      General: No focal deficit present.      Mental Status: She is alert and oriented to person, place, and time.   Psychiatric:         Mood and Affect: Mood normal.         Behavior: Behavior normal.         Thought Content: Thought content normal.         Judgment: Judgment normal.                             Assessment & Plan        Assessment & Plan  Acute abdomen      Fever, unspecified fever cause       At this time, I feel the patient requires a higher level of care including closer monitoring, stat lab work and/or imaging for further evaluation for complaints of Acute abdomen and Fever..This has been discussed with the patient and they state agreement and " understanding. The patient is in no acute distress upon clinic departure and will go directly to ED without delay.     Katiana Vanegas P.A.-C.

## 2024-09-19 NOTE — ED PROVIDER NOTES
"ED Provider Note    CHIEF COMPLAINT  Chief Complaint   Patient presents with    Abdominal Pain     Pt to triage reports generalized abdominal pain x 2 days. Also reports abdominal distention.     Fever     X 2 days. T-max of 102. Took combo tylenol-ibuprofen 4 tablets at 1pm       EXTERNAL RECORDS REVIEWED  Urgent care note evaluation for abdominal pain    HPI/ROS  LIMITATION TO HISTORY   Select: : None  OUTSIDE HISTORIAN(S):  None    Oralia Mallory is a 38 y.o. female with a past medical history of Jorge-Parkinson-White syndrome presenting to the emergency department for evaluation of generalized abdominal discomfort.  Patient says that she has a feeling that she has a feeling that she \"drank too much alcohol then ate way too much food\" and has generalized abdominal discomfort that has been ongoing and unrelieved last 3 days.  She says she has had associated fevers.  No dysuria.  No coughing presputum production.  No flank pain.  No diarrhea.  Pain is predominantly in the epigastrium left upper quadrant.  Says she has associated nausea and vomiting.  Has had reduced appetite over the last several days.  She was seen in urgent care earlier today sent to the emergency department for evaluation.    She is status post .  No prior history of cholecystectomy or appendectomy.    PAST MEDICAL HISTORY   has a past medical history of Neurologic cardiac syncope, Pregnancy, and Stroke (HCC).    SURGICAL HISTORY   has a past surgical history that includes other cardiac surgery and primary c section (09/15/2013).    FAMILY HISTORY  Family History   Problem Relation Age of Onset    Cancer Mother     Peripheral vascular disease Mother        SOCIAL HISTORY  Social History     Tobacco Use    Smoking status: Former     Current packs/day: 0.00     Types: Cigarettes     Quit date: 9/15/2010     Years since quittin.0    Smokeless tobacco: Never   Vaping Use    Vaping status: Never Used   Substance and Sexual " "Activity    Alcohol use: No    Drug use: No    Sexual activity: Not on file       CURRENT MEDICATIONS  Home Medications       Reviewed by Erlinda Singh R.N. (Registered Nurse) on 24 at 1820  Med List Status: <None>     Medication Last Dose Status   albuterol 108 (90 Base) MCG/ACT Aero Soln inhalation aerosol  Active   propranolol (INDERAL) 60 MG tablet  Active                    ALLERGIES  Allergies   Allergen Reactions    Eggs Anaphylaxis    Nkda [No Known Drug Allergy]        PHYSICAL EXAM  VITAL SIGNS: BP (!) 144/79   Pulse 80   Temp 36.6 °C (97.9 °F) (Temporal)   Resp 18   Ht 1.626 m (5' 4\")   Wt (!) 133 kg (292 lb 15.9 oz)   LMP 2024   SpO2 94%   BMI 50.29 kg/m²    General: Well- appearing , non-toxic, no acute distress  Neuro: oriented x 3, moving all extremities.   HEENT:   - Head: Normocephalic, atraumatic  - Eyes: PERRL  - Ears/Nose: normal external nose and ears  - Mouth: moist mucosal membranes  Resp: clear to auscultation, no increased work of breathing  CV: Regular rate and rhythm  Abd: Soft, mildly distended, mild generalized tenderness to palpation no rigidity or guarding no rebound tenderness  Extremities: No peripheral edema  Psych: lucid and conversational           DIAGNOSTIC STUDIES / PROCEDURES    EKG  My independent EKG interpretation:  Results for orders placed or performed during the hospital encounter of 24   EKG (NOW)   Result Value Ref Range    Report       [facility] Emergency Dept.    Test Date:  2024  Pt Name:    ARACELIS RIDDLE               Department: ER  MRN:        [id number]                      Room:       TRAUMA - EXAM 1  Gender:     Female                       Technician: [id number]  :        1985                   Requested By:ENRIQUE GUALLPA  Order #:    [id number]                    Reading MD:    Measurements  Intervals                                Axis  Rate:       72                           P:          17  WV:       "   151                          QRS:        -1  QRSD:       95                           T:          32  QT:         405  QTc:        444    Interpretive Statements  Sinus rhythm  Probable left ventricular hypertrophy  Compared to ECG 12/21/2008 19:17:43  No significant changes         LABS  Results for orders placed or performed during the hospital encounter of 09/18/24   CBC WITH DIFFERENTIAL   Result Value Ref Range    WBC 13.3 (H) 4.8 - 10.8 K/uL    RBC 4.69 4.20 - 5.40 M/uL    Hemoglobin 14.9 12.0 - 16.0 g/dL    Hematocrit 41.2 37.0 - 47.0 %    MCV 87.8 81.4 - 97.8 fL    MCH 31.8 27.0 - 33.0 pg    MCHC 36.2 (H) 32.2 - 35.5 g/dL    RDW 39.7 35.9 - 50.0 fL    Platelet Count 291 164 - 446 K/uL    MPV 9.9 9.0 - 12.9 fL    Neutrophils-Polys 66.00 44.00 - 72.00 %    Lymphocytes 26.50 22.00 - 41.00 %    Monocytes 4.80 0.00 - 13.40 %    Eosinophils 2.00 0.00 - 6.90 %    Basophils 0.40 0.00 - 1.80 %    Immature Granulocytes 0.30 0.00 - 0.90 %    Nucleated RBC 0.00 0.00 - 0.20 /100 WBC    Neutrophils (Absolute) 8.77 (H) 1.82 - 7.42 K/uL    Lymphs (Absolute) 3.52 1.00 - 4.80 K/uL    Monos (Absolute) 0.64 0.00 - 0.85 K/uL    Eos (Absolute) 0.27 0.00 - 0.51 K/uL    Baso (Absolute) 0.05 0.00 - 0.12 K/uL    Immature Granulocytes (abs) 0.04 0.00 - 0.11 K/uL    NRBC (Absolute) 0.00 K/uL   COMP METABOLIC PANEL   Result Value Ref Range    Sodium 139 135 - 145 mmol/L    Potassium 3.7 3.6 - 5.5 mmol/L    Chloride 105 96 - 112 mmol/L    Co2 22 20 - 33 mmol/L    Anion Gap 12.0 7.0 - 16.0    Glucose 126 (H) 65 - 99 mg/dL    Bun 16 8 - 22 mg/dL    Creatinine 0.80 0.50 - 1.40 mg/dL    Calcium 8.9 8.5 - 10.5 mg/dL    Correct Calcium 9.0 8.5 - 10.5 mg/dL    AST(SGOT) 16 12 - 45 U/L    ALT(SGPT) 17 2 - 50 U/L    Alkaline Phosphatase 86 30 - 99 U/L    Total Bilirubin 0.2 0.1 - 1.5 mg/dL    Albumin 3.9 3.2 - 4.9 g/dL    Total Protein 7.0 6.0 - 8.2 g/dL    Globulin 3.1 1.9 - 3.5 g/dL    A-G Ratio 1.3 g/dL   LIPASE   Result Value Ref Range     Lipase 31 11 - 82 U/L   HCG QUAL SERUM   Result Value Ref Range    Beta-Hcg Qualitative Serum Negative Negative   URINALYSIS,CULTURE IF INDICATED    Specimen: Urine   Result Value Ref Range    Color Yellow     Character Clear     Specific Gravity 1.022 <1.035    Ph 5.0 5.0 - 8.0    Glucose Negative Negative mg/dL    Ketones Negative Negative mg/dL    Protein Negative Negative mg/dL    Bilirubin Negative Negative    Urobilinogen, Urine 0.2 Negative    Nitrite Negative Negative    Leukocyte Esterase Negative Negative    Occult Blood Negative Negative    Micro Urine Req see below    ESTIMATED GFR   Result Value Ref Range    GFR (CKD-EPI) 96 >60 mL/min/1.73 m 2   EKG (NOW)   Result Value Ref Range    Report       [facility] Emergency Dept.    Test Date:  2024  Pt Name:    ARACELIS RIDDLE               Department: ER  MRN:        [id number]                      Room:       TRAUMA - EXAM 1  Gender:     Female                       Technician: [id number]  :        1985                   Requested By:ENRIQUE GUALLPA  Order #:    [id number]                    Reading MD:    Measurements  Intervals                                Axis  Rate:       72                           P:          17  NC:         151                          QRS:        -1  QRSD:       95                           T:          32  QT:         405  QTc:        444    Interpretive Statements  Sinus rhythm  Probable left ventricular hypertrophy  Compared to ECG 2008 19:17:43  No significant changes     POC CoV-2, FLU A/B, RSV by PCR   Result Value Ref Range    POC Influenza A RNA, PCR Negative Negative    POC Influenza B RNA, PCR Negative Negative    POC RSV, by PCR Negative Negative    POC SARS-CoV-2, PCR NotDetected NotDetected       RADIOLOGY  I have independently interpreted the diagnostic imaging associated with this visit and am waiting the final reading from the radiologist.   My preliminary interpretation is as  "follows:   - CT abdomen pelvis shows no evidence of acute intra-abdominal pathology  Radiologist interpretation:   CT-ABDOMEN-PELVIS WITH   Final Result      1.  No acute abnormalities in the abdomen or pelvis.      2.  3 cm hiatal hernia.              MEDICAL DECISION MAKING      ED COURSE AND PLAN    Oralia Mallory is a 38 y.o. female presenting to the emergency department for epigastric and left upper quadrant abdominal discomfort.  Patient says that she likely \"takes too much ibuprofen \", today had worsening exacerbation of her pain so she decided to come into the emergency department, was referred from urgent care.   On arrival to the emergency department she is hypertensive borderline tachycardic but vital signs otherwise stable she is afebrile.  We obtained baseline labs per triage protocol, CBC with borderline leukocytosis 13.3.  Chemistry is unremarkable.  Lipase negative.  COVID flu RSV testing is negative.  Her hCG is negative.  I obtained a CT abdomen pelvis which showed no evidence of acute intra-abdominal pathology.  She was given GI cocktail Pepcid Carafate IV fluids Zofran and Bentyl on arrival to the emergency department with near complete resolution of her abdominal discomfort.  Patient will be discharged home with a prescription for Pepcid and Carafate.  She is comfortable with the above plan, strict precautions discussed.        ---Pertinent ED Course---:    8:14 PM I reviewed the patient's old records in Epic, medication list, allergies, past medical history and performed a physical examination.           Hydration: Based on the patient's presentation of Dehydration the patient was given IV fluids. IV Hydration was used because oral hydration was not adequate alone. Upon recheck following hydration, the patient was stable.    Procedures:      ----------------------------------------------------------------------------------  DISCUSSIONS    I have discussed management of the patient with the " following physicians and RITO's:      Discussion of management with other QHP or appropriate source(s):     Escalation of care considered, and ultimately not performed: Consider right upper quadrant ultrasound     Barriers to care at this time, including but not limited to:     Decision tools and prescription drugs considered including, but not limited to: Prescribed Pepcid, Carafate    FINAL IMPRESSION    1. LUQ abdominal pain        Discharge Medication List as of 9/18/2024 11:17 PM        START taking these medications    Details   sucralfate (CARAFATE) 1 GM Tab Take 1 Tablet by mouth 4 Times a Day,Before Meals and at Bedtime., Disp-40 Tablet, R-3, Normal      famotidine (PEPCID) 20 MG Tab Take 1 Tablet by mouth 2 times a day., Disp-60 Tablet, R-0, Normal               DISPOSITION    Discharge home, Stable    This chart was dictated using an electronic voice recognition software. The chart has been reviewed and edited but there is still possibility for dictation errors due to limitation of software.    Rafal Mendez, DO 9/18/2024

## 2024-09-19 NOTE — DISCHARGE INSTRUCTIONS
You were seen in the emergency department for upper abdominal pain likely related to your GERD and gastric ulcers.  Medication helped with your symptoms.  We are going to prescribe you a medication called Pepcid to help with acid suppression.  Please follow-up with your primary care physician.  You would benefit from referral to gastroenterology, you should undergo frequent screenings by GI with an upper endoscopy, a video scope exam of your esophagus and stomach to ensure that you do not develop esophageal or stomach cancer.    You should be tested for H. pylori.  Please avoid all NSAIDs like ibuprofen and naproxen

## 2024-09-19 NOTE — ED NOTES
Patient ambulated without assistance to room. Patient in gown, on monito Chart up for next available ERP.

## 2025-02-06 DIAGNOSIS — J06.9 VIRAL URI: ICD-10-CM

## 2025-02-06 RX ORDER — METHYLPREDNISOLONE 4 MG/1
TABLET ORAL
Qty: 21 TABLET | Refills: 0 | Status: SHIPPED | OUTPATIENT
Start: 2025-02-06

## 2025-02-06 RX ORDER — ALBUTEROL SULFATE 90 UG/1
2 INHALANT RESPIRATORY (INHALATION) EVERY 6 HOURS PRN
Qty: 8.5 G | Refills: 2 | Status: SHIPPED | OUTPATIENT
Start: 2025-02-06